# Patient Record
Sex: MALE | Race: BLACK OR AFRICAN AMERICAN | NOT HISPANIC OR LATINO | Employment: FULL TIME | URBAN - METROPOLITAN AREA
[De-identification: names, ages, dates, MRNs, and addresses within clinical notes are randomized per-mention and may not be internally consistent; named-entity substitution may affect disease eponyms.]

---

## 2024-02-07 ENCOUNTER — HOSPITAL ENCOUNTER (EMERGENCY)
Facility: CLINIC | Age: 49
Discharge: HOME | End: 2024-02-07
Attending: EMERGENCY MEDICINE | Admitting: EMERGENCY MEDICINE
Payer: COMMERCIAL

## 2024-02-07 ENCOUNTER — APPOINTMENT (OUTPATIENT)
Dept: GENERAL RADIOLOGY | Facility: CLINIC | Age: 49
End: 2024-02-07
Attending: EMERGENCY MEDICINE
Payer: COMMERCIAL

## 2024-02-07 VITALS
HEIGHT: 71 IN | BODY MASS INDEX: 36.54 KG/M2 | HEART RATE: 82 BPM | RESPIRATION RATE: 18 BRPM | DIASTOLIC BLOOD PRESSURE: 70 MMHG | SYSTOLIC BLOOD PRESSURE: 158 MMHG | TEMPERATURE: 96.7 F | WEIGHT: 261.02 LBS | OXYGEN SATURATION: 100 %

## 2024-02-07 DIAGNOSIS — R07.89 CHEST WALL PAIN: ICD-10-CM

## 2024-02-07 DIAGNOSIS — R05.9 COUGH, UNSPECIFIED TYPE: ICD-10-CM

## 2024-02-07 DIAGNOSIS — B33.8 RSV INFECTION: Primary | ICD-10-CM

## 2024-02-07 LAB
ANION GAP SERPL CALCULATED.3IONS-SCNC: 12 MMOL/L (ref 7–15)
BASOPHILS # BLD AUTO: 0 10E3/UL (ref 0–0.2)
BASOPHILS NFR BLD AUTO: 1 %
BUN SERPL-MCNC: 17.5 MG/DL (ref 6–20)
CALCIUM SERPL-MCNC: 9.6 MG/DL (ref 8.6–10)
CHLORIDE SERPL-SCNC: 98 MMOL/L (ref 98–107)
CREAT SERPL-MCNC: 1.02 MG/DL (ref 0.67–1.17)
D DIMER PPP FEU-MCNC: <0.27 UG/ML FEU (ref 0–0.5)
DEPRECATED HCO3 PLAS-SCNC: 26 MMOL/L (ref 22–29)
EGFRCR SERPLBLD CKD-EPI 2021: >90 ML/MIN/1.73M2
EOSINOPHIL # BLD AUTO: 0.1 10E3/UL (ref 0–0.7)
EOSINOPHIL NFR BLD AUTO: 2 %
ERYTHROCYTE [DISTWIDTH] IN BLOOD BY AUTOMATED COUNT: 12.6 % (ref 10–15)
FLUAV RNA SPEC QL NAA+PROBE: NEGATIVE
FLUBV RNA RESP QL NAA+PROBE: NEGATIVE
GLUCOSE SERPL-MCNC: 125 MG/DL (ref 70–99)
HCT VFR BLD AUTO: 47.2 % (ref 40–53)
HGB BLD-MCNC: 15.5 G/DL (ref 13.3–17.7)
HOLD SPECIMEN: NORMAL
HOLD SPECIMEN: NORMAL
IMM GRANULOCYTES # BLD: 0 10E3/UL
IMM GRANULOCYTES NFR BLD: 0 %
LYMPHOCYTES # BLD AUTO: 3.2 10E3/UL (ref 0.8–5.3)
LYMPHOCYTES NFR BLD AUTO: 51 %
MCH RBC QN AUTO: 30.6 PG (ref 26.5–33)
MCHC RBC AUTO-ENTMCNC: 32.8 G/DL (ref 31.5–36.5)
MCV RBC AUTO: 93 FL (ref 78–100)
MONOCYTES # BLD AUTO: 0.5 10E3/UL (ref 0–1.3)
MONOCYTES NFR BLD AUTO: 7 %
NEUTROPHILS # BLD AUTO: 2.5 10E3/UL (ref 1.6–8.3)
NEUTROPHILS NFR BLD AUTO: 39 %
NRBC # BLD AUTO: 0 10E3/UL
NRBC BLD AUTO-RTO: 0 /100
NT-PROBNP SERPL-MCNC: <36 PG/ML (ref 0–450)
PLATELET # BLD AUTO: 273 10E3/UL (ref 150–450)
POTASSIUM SERPL-SCNC: 4.2 MMOL/L (ref 3.4–5.3)
RBC # BLD AUTO: 5.06 10E6/UL (ref 4.4–5.9)
RSV RNA SPEC NAA+PROBE: POSITIVE
SARS-COV-2 RNA RESP QL NAA+PROBE: NEGATIVE
SODIUM SERPL-SCNC: 136 MMOL/L (ref 135–145)
TROPONIN T SERPL HS-MCNC: 10 NG/L
TROPONIN T SERPL HS-MCNC: 7 NG/L
WBC # BLD AUTO: 6.3 10E3/UL (ref 4–11)

## 2024-02-07 PROCEDURE — 36415 COLL VENOUS BLD VENIPUNCTURE: CPT | Performed by: EMERGENCY MEDICINE

## 2024-02-07 PROCEDURE — 99285 EMERGENCY DEPT VISIT HI MDM: CPT | Mod: 25

## 2024-02-07 PROCEDURE — 80048 BASIC METABOLIC PNL TOTAL CA: CPT | Performed by: EMERGENCY MEDICINE

## 2024-02-07 PROCEDURE — 83880 ASSAY OF NATRIURETIC PEPTIDE: CPT | Performed by: EMERGENCY MEDICINE

## 2024-02-07 PROCEDURE — 85041 AUTOMATED RBC COUNT: CPT | Performed by: EMERGENCY MEDICINE

## 2024-02-07 PROCEDURE — 93005 ELECTROCARDIOGRAM TRACING: CPT

## 2024-02-07 PROCEDURE — 84484 ASSAY OF TROPONIN QUANT: CPT | Performed by: EMERGENCY MEDICINE

## 2024-02-07 PROCEDURE — 71046 X-RAY EXAM CHEST 2 VIEWS: CPT

## 2024-02-07 PROCEDURE — 85379 FIBRIN DEGRADATION QUANT: CPT | Performed by: EMERGENCY MEDICINE

## 2024-02-07 PROCEDURE — 87637 SARSCOV2&INF A&B&RSV AMP PRB: CPT | Performed by: EMERGENCY MEDICINE

## 2024-02-07 RX ORDER — BENZONATATE 200 MG/1
200 CAPSULE ORAL 3 TIMES DAILY PRN
Qty: 21 CAPSULE | Refills: 0 | Status: SHIPPED | OUTPATIENT
Start: 2024-02-07 | End: 2024-02-14

## 2024-02-07 ASSESSMENT — ENCOUNTER SYMPTOMS
DYSURIA: 0
CHILLS: 0
ABDOMINAL PAIN: 0
VOMITING: 0
DIZZINESS: 0
FEVER: 0
SHORTNESS OF BREATH: 0
COUGH: 1
BACK PAIN: 0
HEMATURIA: 0
HEADACHES: 0
RHINORRHEA: 0
NECK PAIN: 0
NAUSEA: 0

## 2024-02-07 ASSESSMENT — ACTIVITIES OF DAILY LIVING (ADL): ADLS_ACUITY_SCORE: 33

## 2024-02-07 NOTE — ED TRIAGE NOTES
Cough attacks for last week. Increased sweating at rest and decreased appetite. Went to [facility] and sent here for EKG changes.

## 2024-02-08 LAB
ATRIAL RATE - MUSE: 82 BPM
DIASTOLIC BLOOD PRESSURE - MUSE: NORMAL MMHG
INTERPRETATION ECG - MUSE: NORMAL
P AXIS - MUSE: 51 DEGREES
PR INTERVAL - MUSE: 162 MS
QRS DURATION - MUSE: 106 MS
QT - MUSE: 334 MS
QTC - MUSE: 390 MS
R AXIS - MUSE: -31 DEGREES
SYSTOLIC BLOOD PRESSURE - MUSE: NORMAL MMHG
T AXIS - MUSE: 24 DEGREES
VENTRICULAR RATE- MUSE: 82 BPM

## 2024-02-08 NOTE — ED PROVIDER NOTES
"  History     Chief Complaint:  Chest Pain       HPI   Ina Lynne is a 48 year old male presents to the emergency department with cough and sputum production that is keeping him up at nighttime.  Patient states that the symptoms have been going on for 1 week which initially started off as a sore throat and a cough.  Patient denies any fever, but endorses breaking out in sweats.  He endorses some sharp pains with coughing, but otherwise denies any chest pain or shortness of breath.  Predominant concern is that he is having significant sputum production that has not significantly improved with Mucinex.  No prior cardiac or pulmonary history.  No history of DVT/PE.  No recent travels or surgery.  No hormone supplementation.    Review of Systems   Constitutional:  Negative for chills and fever.   HENT:  Negative for congestion and rhinorrhea.    Respiratory:  Positive for cough. Negative for shortness of breath.    Gastrointestinal:  Negative for abdominal pain, nausea and vomiting.   Genitourinary:  Negative for dysuria and hematuria.   Musculoskeletal:  Negative for back pain and neck pain.   Neurological:  Negative for dizziness and headaches.     Independent Historian:   None - Patient Only    Review of External Notes:   Urgent care visit note from today.      Medications:    None    Past Medical History:    Past Medical History:   Diagnosis Date    NO ACTIVE PROBLEMS        Past Surgical History:    No past surgical history on file.     Physical Exam   Patient Vitals for the past 24 hrs:   BP Temp Temp src Pulse Resp SpO2 Height Weight   02/07/24 2038 (!) 158/70 -- -- 82 18 100 % -- --   02/07/24 1719 (!) 160/113 (!) 96.7  F (35.9  C) Temporal 91 18 98 % 1.803 m (5' 11\") 118.4 kg (261 lb 0.4 oz)        Constitutional:       Appearance: Normal appearance.   HENT:      Head: Normocephalic and atraumatic.   Eyes:      Extraocular Movements: Extraocular movements intact.      Conjunctiva/sclera: Conjunctivae " normal.   Cardiovascular:      Rate and Rhythm: Normal rate and regular rhythm.   Pulmonary:      Effort: Pulmonary effort is normal. No respiratory distress.      Breath sounds: Clear to auscultation bilaterally.  Abdominal:      General: Abdomen is flat. There is no distension.      Palpations: Abdomen is soft.      Tenderness: There is no abdominal tenderness.   Musculoskeletal:      Cervical back: Normal range of motion. No rigidity.       Right lower leg: No edema.      Left lower leg: No edema.   Skin:     General: Skin is warm and dry.   Neurological:      General: No focal deficit present.      Mental Status: Alert and oriented to person, place, and time.   Psychiatric:         Mood and Affect: Mood normal.         Behavior: Behavior normal.    Emergency Department Course   ECG  ECG results from 02/07/24   EKG 12 lead     Value    Systolic Blood Pressure     Diastolic Blood Pressure     Ventricular Rate 82    Atrial Rate 82    NJ Interval 162    QRS Duration 106        QTc 390    P Axis 51    R AXIS -31    T Axis 24    Interpretation ECG      Sinus rhythm  Left axis deviation  RSR' or QR pattern in V1 suggests right ventricular conduction delay  Voltage criteria for left ventricular hypertrophy  Nonspecific T wave abnormality  Abnormal ECG  When compared with ECG of 06-OCT-2008 20:34,  RSR' pattern in V1 is now Present  Nonspecific T wave abnormality now evident in Lateral leads       See ED course for independent interpretation.     Imaging:  XR Chest 2 Views   Final Result   IMPRESSION: Negative chest.         Report per radiology    Laboratory:  Labs Ordered and Resulted from Time of ED Arrival to Time of ED Departure   BASIC METABOLIC PANEL - Abnormal       Result Value    Sodium 136      Potassium 4.2      Chloride 98      Carbon Dioxide (CO2) 26      Anion Gap 12      Urea Nitrogen 17.5      Creatinine 1.02      GFR Estimate >90      Calcium 9.6      Glucose 125 (*)    INFLUENZA A/B, RSV, &  SARS-COV2 PCR - Abnormal    Influenza A PCR Negative      Influenza B PCR Negative      RSV PCR Positive (*)     SARS CoV2 PCR Negative     TROPONIN T, HIGH SENSITIVITY - Normal    Troponin T, High Sensitivity 10     D DIMER QUANTITATIVE - Normal    D-Dimer Quantitative <0.27     TROPONIN T, HIGH SENSITIVITY - Normal    Troponin T, High Sensitivity 7     NT PROBNP INPATIENT - Normal    N terminal Pro BNP Inpatient <36     CBC WITH PLATELETS AND DIFFERENTIAL    WBC Count 6.3      RBC Count 5.06      Hemoglobin 15.5      Hematocrit 47.2      MCV 93      MCH 30.6      MCHC 32.8      RDW 12.6      Platelet Count 273      % Neutrophils 39      % Lymphocytes 51      % Monocytes 7      % Eosinophils 2      % Basophils 1      % Immature Granulocytes 0      NRBCs per 100 WBC 0      Absolute Neutrophils 2.5      Absolute Lymphocytes 3.2      Absolute Monocytes 0.5      Absolute Eosinophils 0.1      Absolute Basophils 0.0      Absolute Immature Granulocytes 0.0      Absolute NRBCs 0.0          Emergency Department Course & Assessments:       Interventions:  Medications - No data to display     Independent Interpretation (X-rays, CTs, rhythm strip):  See ED course    Assessments/Consultations/Discussion of Management or Tests:  ED Course as of 02/08/24 0318 Wed Feb 07, 2024   1725 EKG 12 lead  EKG normal sinus rhythm.  Rate of 82.  .  .  QTc 390.  No acute ST elevation or depression as compared with 10/6/2000 the EKG.  Nonspecific ST changes in the lateral leads.  Mildly similar QRS morphology in leads II and III.  Will have patient follow-up outpatient with primary regarding abnormal EKG appearance   1946 Resp Syncytial Virus(!): Positive  Likely contributing cause of patient's cough.   1946 D-Dimer Quantitative: <0.27   1946 XR Chest 2 Views  My interpretation of chest x-ray, there is no obvious focal opacity, mediastinal widening, or pneumothorax   2027 Patient updated on lab and image findings.  In no acute  distress at time of reevaluation.  Symptoms today likely all secondary to RSV infection.  No evidence of ACS.  Dimer negative.  Will discharge patient with Tessalon Perles and advised for patient to follow-up with PCP.  Encouraged continued Mucinex usage and air humidifier usage and elevation to pillow.  Continue to follow-up with PCP regarding EKG findings.  No chest pain or shortness of breath at this time and high sensitive troponin negative.  Unlikely acute cardiac process.  Discussed return precautions.  Answered all questions.  Patient voiced understanding and agreement with plan     Social Determinants of Health affecting care:   None    Disposition:  The patient was discharged to home.     Impression & Plan    CMS Diagnoses: None    Medical Decision Makin-year-old male as described above presents to the emergency department for cough, excessive sputum production, and difficulty sleeping at night due to these above-stated symptoms.  He does have some sharp chest wall pain with coughing, but denies any shortness of breath or chest pain at rest.  No prior cardiac history.  Patient hemodynamically stable at time of evaluation.  Afebrile.  Differential diagnosis considered includes, but not limited to, acute viral URI, pneumonia, pulmonary embolism, pericarditis/myocarditis, CHF, and less likely, ACS.  Cardiac workup ordered.  2 set cardiac enzymes.  D-dimer for PE rule out.  Viral swab.  Discussed care plan with patient who voiced understanding and agreement with plan.  Answered all questions.  Additional workup and orders as listed in chart.    Please refer to ED course above as part of continuation of MDM for details on the patient's treatment course and any changes or updates in care plan beyond my initial evaluation and MDM creation.      Diagnosis:    ICD-10-CM    1. RSV infection  B33.8       2. Cough, unspecified type  R05.9       3. Chest wall pain  R07.89            Discharge  Medications:  Discharge Medication List as of 2/7/2024  8:33 PM        START taking these medications    Details   benzonatate (TESSALON) 200 MG capsule Take 1 capsule (200 mg) by mouth 3 times daily as needed for cough, Disp-21 capsule, R-0, E-Prescribe             ENRIQUE GARCIA DO  2/7/2024   Enrique Garcia DO Yeh, [place], DO  02/08/24 0318